# Patient Record
Sex: FEMALE | Race: ASIAN | NOT HISPANIC OR LATINO | Employment: UNEMPLOYED | URBAN - NONMETROPOLITAN AREA
[De-identification: names, ages, dates, MRNs, and addresses within clinical notes are randomized per-mention and may not be internally consistent; named-entity substitution may affect disease eponyms.]

---

## 2021-09-22 ENCOUNTER — OFFICE VISIT (OUTPATIENT)
Dept: GASTROENTEROLOGY | Facility: CLINIC | Age: 51
End: 2021-09-22

## 2021-09-22 VITALS
RESPIRATION RATE: 14 BRPM | HEIGHT: 63 IN | DIASTOLIC BLOOD PRESSURE: 69 MMHG | WEIGHT: 116 LBS | BODY MASS INDEX: 20.55 KG/M2 | TEMPERATURE: 95.4 F | SYSTOLIC BLOOD PRESSURE: 129 MMHG | HEART RATE: 77 BPM

## 2021-09-22 DIAGNOSIS — D50.9 IRON DEFICIENCY ANEMIA, UNSPECIFIED IRON DEFICIENCY ANEMIA TYPE: Primary | ICD-10-CM

## 2021-09-22 DIAGNOSIS — Z12.11 ENCOUNTER FOR SCREENING FOR MALIGNANT NEOPLASM OF COLON: ICD-10-CM

## 2021-09-22 PROCEDURE — 99203 OFFICE O/P NEW LOW 30 MIN: CPT | Performed by: NURSE PRACTITIONER

## 2021-09-22 RX ORDER — FERROUS SULFATE 325(65) MG
325 TABLET ORAL
COMMUNITY
End: 2021-12-20

## 2021-09-22 RX ORDER — SODIUM CHLORIDE 9 MG/ML
70 INJECTION, SOLUTION INTRAVENOUS CONTINUOUS PRN
Status: CANCELLED | OUTPATIENT
Start: 2021-09-22

## 2021-09-22 NOTE — PROGRESS NOTES
New Patient Consult      Date: 2021   Patient Name: Nanci Perera  MRN: [id number]  : 1970     Primary Care Provider: Sofia Bertrand DO    Chief Complaint   Patient presents with   • Anemia     History of Present Illness: Nanci Perera is a 50 y.o. female who is here today to establish care with Gastroenterology for anemia.    She has a history of anemia for a few years. There is no history of GI bleeding, patient denies hematochezia, melena or hematemesis. The patient denies heavy menstrual cycles or nosebleeds. The patient denies recent change in bowel habits. There is no diarrhea or constipation. There is no history of abdominal pain. The patient denies nausea or vomiting. There is no history of reflux. The patient denies dysphagia or odynophagia. There is no history of recent significant weight loss. There is no history of liver disease in the past. There is no family history of GI malignancy. The patient has not had a colonoscopy in the past.    The patient was seen along with her  and video  with patient's consent.    Subjective      Past Medical History:   Diagnosis Date   • Iron deficiency anemia      History reviewed. No pertinent surgical history.  Family History   Problem Relation Age of Onset   • Colon cancer Neg Hx      Social History     Socioeconomic History   • Marital status:      Spouse name: Not on file   • Number of children: Not on file   • Years of education: Not on file   • Highest education level: Not on file   Tobacco Use   • Smoking status: Former Smoker     Quit date:      Years since quittin.7   • Smokeless tobacco: Never Used   Vaping Use   • Vaping Use: Never used   Substance and Sexual Activity   • Alcohol use: Yes     Comment: social   • Drug use: Never   • Sexual activity: Defer       Current Outpatient Medications:   •  ferrous sulfate 325 (65 FE) MG tablet, Take 325 mg by mouth Daily With Breakfast., Disp: , Rfl:     No  "Known Allergies     The following portions of the patient's history were reviewed and updated as appropriate: allergies, current medications, past family history, past medical history, past social history, past surgical history and problem list.    Objective     Physical Exam  Vitals and nursing note reviewed.   Constitutional:       General: She is not in acute distress.     Appearance: Normal appearance. She is well-developed.   HENT:      Head: Normocephalic and atraumatic.      Right Ear: Hearing normal.      Left Ear: Hearing normal.      Mouth/Throat:      Mouth: Mucous membranes are not pale, not dry and not cyanotic.   Eyes:      General: Lids are normal.      Conjunctiva/sclera: Conjunctivae normal.   Neck:      Trachea: Trachea normal.   Cardiovascular:      Rate and Rhythm: Normal rate and regular rhythm.      Heart sounds: Normal heart sounds.   Pulmonary:      Effort: Pulmonary effort is normal. No respiratory distress.      Breath sounds: Normal breath sounds.   Abdominal:      General: Bowel sounds are normal.      Palpations: Abdomen is soft. There is no mass.      Tenderness: There is no abdominal tenderness.      Hernia: No hernia is present.   Skin:     General: Skin is warm and dry.   Neurological:      Mental Status: She is alert and oriented to person, place, and time.   Psychiatric:         Mood and Affect: Mood normal.         Speech: Speech normal.         Behavior: Behavior normal. Behavior is cooperative.       Vitals:    09/22/21 0830   BP: 129/69   Pulse: 77   Resp: 14   Temp: 95.4 °F (35.2 °C)   Weight: 52.6 kg (116 lb)   Height: 160 cm (63\")     Body mass index is 20.55 kg/m².     Results Review:   I have reviewed the patient's new clinical and imaging results.    No results found for any previous visit.      Dated 6/29/2021 total bilirubin 0.3 alkaline phosphatase 41 AST 17 ALT 7 GGT 11 albumin 4.2    Dated 7/15/2021 hemoglobin 10.1 hematocrit 33.9 platelet count 261 iron 27 TIBC 402 " iron saturation 7% vitamin B12 521, folate 9.3, serum ferritin 6    CTAP with contrast dated 7/21/2021 no anterior normal wall masses noted with a marker over the right rectus muscle.  No acute changes are noted with prominence of the endometrial lining.  Electronically signed by MD Pritesh Johnson on 7/21/2021 at 1749    Assessment / Plan      1. Iron deficiency anemia, unspecified iron deficiency anemia type  Patient has a history of anemia for the past few years.  There is no history of GI bleeding, no hematochezia, melena or hematemesis.  Patient denies abdominal pain, nausea, constipation or diarrhea. No change in bowel habits. There is no history of heavy menstrual cycles or nosebleeds.  Labs in July 2021 with hemoglobin 10.1 and hematocrit 33.9, normal iron count at 27, iron saturation low at 7%, B12 normal. She has been taking oral iron supplements for about a month.  CTAP with unremarkable GI tract, although uterus was deviated to the left with thickening of the endometrium.  The patient has appointment for gynecology evaluation next week.  Keep appointment with gynecology.  The patient is due for screening colonoscopy, will rule out lower GI bleeding as cause for anemia.    2. Encounter for screening for malignant neoplasm of colon  She has not had a colonoscopy in the past.  There is no family history of colon cancer.  Colonoscopy for colon cancer screening.    - Case Request; Standing  - Implement Anesthesia Orders Day of Procedure; Standing  - Obtain Informed Consent; Standing  - Verify Bowel Prep Was Successful; Standing  - Oxygen Therapy- Nasal Cannula; 2 LPM; Titrate for SPO2: equal to or greater than, 90%; Standing  - POC Glucose Once; Standing  - sodium chloride 0.9 % infusion  - Case Request    Patient Instructions   Keep appointment with gynecology next week.  Colonoscopy: The indications, technique, alternatives and potential risk and complications were discussed with the patient including but  not limited to bleeding, perforations, missing lesions and anesthetic complications. The patient understands and wishes to proceed with the procedure and has given their verbal consent. Written patient education information was given to the patient.   The patient will call if they have further questions before procedure.     The patient was seen along with her  and video  with her consent.     Jac Magallanes, APRN  9/22/2021    Please note that portions of this note may have been completed with a voice recognition program. Efforts were made to edit the dictations, but occasionally words are mistranscribed.

## 2021-09-22 NOTE — PATIENT INSTRUCTIONS
Keep appointment with gynecology next week.  Colonoscopy: The indications, technique, alternatives and potential risk and complications were discussed with the patient including but not limited to bleeding, perforations, missing lesions and anesthetic complications. The patient understands and wishes to proceed with the procedure and has given their verbal consent. Written patient education information was given to the patient.   The patient will call if they have further questions before procedure.     The patient was seen along with her  and video  with her consent.

## 2021-09-27 ENCOUNTER — OFFICE VISIT (OUTPATIENT)
Dept: OBSTETRICS AND GYNECOLOGY | Facility: CLINIC | Age: 51
End: 2021-09-27

## 2021-09-27 VITALS
DIASTOLIC BLOOD PRESSURE: 66 MMHG | BODY MASS INDEX: 19.84 KG/M2 | HEIGHT: 63 IN | SYSTOLIC BLOOD PRESSURE: 118 MMHG | WEIGHT: 112 LBS

## 2021-09-27 DIAGNOSIS — N92.0 MENORRHAGIA WITH REGULAR CYCLE: Primary | ICD-10-CM

## 2021-09-27 DIAGNOSIS — D50.8 OTHER IRON DEFICIENCY ANEMIA: ICD-10-CM

## 2021-09-27 PROCEDURE — 99204 OFFICE O/P NEW MOD 45 MIN: CPT | Performed by: OBSTETRICS & GYNECOLOGY

## 2021-09-27 NOTE — PROGRESS NOTES
Subjective     Chief Complaint   Patient presents with   • Gynecologic Exam     patient had ct scan in [place] showing endometrium thickened and anemia       Nanci Perera is a 50 y.o. year old  presenting to be seen for gyn option for anemia work up.      History of Present Illness  This 50-year-old nulliparous patient is being seen today in conjunction with work-up of anemia.  She is scheduled to have but has not had a colonoscopy.  She was noted to have mild anemia on routine evaluation.  She has regular menstrual cycles which are perhaps slightly heavier in flow and are of 7 days duration.  She has no intermenstrual bleeding.  She is not sexually active.  She was noted to have an endometrium of 12 mm thickness on a CT scan which I believe was otherwise normal.  She has had normal Pap smears.  And had a normal gynecologic exam in 2021.  She has not had an ultrasound of the pelvis.  She has never used hormonal contraception.  She has never attempted to become pregnant  There is a language barrier to communication and the patient elects to use her own translation service which was available with audio and video communication during our interview.    Past Medical History:   Diagnosis Date   • Iron deficiency anemia       Family History   Problem Relation Age of Onset   • Diabetes Father    • Diabetes Mother    • Colon cancer Neg Hx       Social History     Socioeconomic History   • Marital status:      Spouse name: Not on file   • Number of children: Not on file   • Years of education: Not on file   • Highest education level: Not on file   Tobacco Use   • Smoking status: Former Smoker     Quit date:      Years since quittin.7   • Smokeless tobacco: Never Used   Vaping Use   • Vaping Use: Never used   Substance and Sexual Activity   • Alcohol use: Yes     Comment: social   • Drug use: Never   • Sexual activity: Defer        Her LMP was Patient's last menstrual period was 2021..  Her  "cycles are regular, predictable and consistent every 28 - 32 days.  Usually her flow is typically normal with no more than 0 days of heavy flow each menses.   Additionally she describes no other symptoms associated with her menses.    She is not sexually active.      Current contraceptive methods being used: abstinence.     She exercises regularly: not asked.  She wears her seat belt: not asked.  She has concerns about domestic violence: not asked.    GYN screening history:  · Last pap: she reports her last PAP was normal  · Last mammogram: she reports her last mammogram was normal  · Last fasting lipid profile: her last lipid panel is not available to confirm her report of the results   · Last 25-hydroxy vitamin D level: her last Vitamin D level is not available to confirm her report of the results   · Last colonoscopy: she has never had a colonoscopy done  · Last DEXA: she has never had a DEXA.    Additional Complaints:  None    The following portions of the patient's history were reviewed and updated as appropriate:vital signs, allergies, current medications, past medical history, past social history, past surgical history and problem list.        Review of Systems   Constitutional: Negative.   HENT: Negative.    Eyes: Negative.    Cardiovascular: Negative.    Respiratory: Negative.    Endocrine: Negative.    Skin: Negative.    Musculoskeletal: Negative.    Gastrointestinal: Negative.    Genitourinary: Negative.    Neurological: Negative.    Psychiatric/Behavioral: Negative.        Objective   /66   Ht 160 cm (63\")   Wt 50.8 kg (112 lb)   LMP 09/07/2021   Breastfeeding No   BMI 19.84 kg/m²   Physical Exam  General:  well developed; well nourished  no acute distress   Constitutional: thin and healthy   Skin:  Not performed.   Thyroid: not examined   Lungs:  breathing is unlabored   Heart:  Not performed.   Breasts:  Not performed.   Abdomen: Not performed.   Pelvis: Not performed.   Musculoskeletal: " negative   Neuro: normal without focal findings, mental status, speech normal, alert and oriented x3 and MIKE   Psych: oriented to time, place and person, mood and affect are within normal limits, pt is a good historian; no memory problems were noted       Lab Review   CBC and CMP    Imaging  CT of abdomen/pelvis report    Assessment/Plan   Diagnosis:  1. Menorrhagia with regular cycle   Although her slightly heavier menstrual cycles may be contributing to her mild anemia I do not at this time have reason to suspect a worrisome endometrial abnormality.  In the context of her premenopausal state the CT scan endometrial thickness does not appear immediately worrisome.  However more conventional imaging with transvaginal ultrasound seems in order   2. Other iron deficiency anemia        Orders Placed This Encounter   Procedures   • US Non-ob Transvaginal     Standing Status:   Future     Standing Expiration Date:   9/27/2022     Order Specific Question:   Reason for Exam:     Answer:   menorrhagia with regular cycle beed endo thickness     No orders of the defined types were placed in this encounter.      Follow up: 6 week(s)       I spent 45 minutes caring for Nanci on this date of service. This time includes time spent by me in the following activities: obtaining and/or reviewing a separately obtained history, performing a medically appropriate examination and/or evaluation, counseling and educating the patient/family/caregiver, ordering medications, tests, or procedures, documenting information in the medical record and independently interpreting results and communicating that information with the patient/family/caregiver.     This note was electronically signed.    Yunier Aldana MD  September 27, 2021

## 2021-10-19 ENCOUNTER — TELEPHONE (OUTPATIENT)
Dept: GASTROENTEROLOGY | Facility: CLINIC | Age: 51
End: 2021-10-19

## 2021-10-19 NOTE — TELEPHONE ENCOUNTER
Called patient to schedule procedure. Reached voicemail. Left detailed message for return call to discuss

## 2021-10-25 ENCOUNTER — TELEPHONE (OUTPATIENT)
Dept: GASTROENTEROLOGY | Facility: CLINIC | Age: 51
End: 2021-10-25

## 2021-10-25 DIAGNOSIS — Z12.11 ENCOUNTER FOR SCREENING FOR MALIGNANT NEOPLASM OF COLON: Primary | ICD-10-CM

## 2021-10-25 RX ORDER — POLYETHYLENE GLYCOL 3350 17 G/17G
POWDER, FOR SOLUTION ORAL
Qty: 238 G | Refills: 0 | Status: SHIPPED | OUTPATIENT
Start: 2021-10-25 | End: 2021-11-19 | Stop reason: HOSPADM

## 2021-10-25 RX ORDER — BISACODYL 5 MG/1
TABLET, DELAYED RELEASE ORAL
Qty: 4 TABLET | Refills: 0 | Status: SHIPPED | OUTPATIENT
Start: 2021-10-25 | End: 2021-11-19 | Stop reason: HOSPADM

## 2021-10-25 NOTE — TELEPHONE ENCOUNTER
----- Message from Christy Marrero MA sent at 10/22/2021 11:47 AM EDT -----  Regarding: Colon Prep  Patient is scheduled for colonoscopy on 11/19 at 8:30. Can you send prep to pharmacy and I will mail instructions

## 2021-11-08 ENCOUNTER — OFFICE VISIT (OUTPATIENT)
Dept: OBSTETRICS AND GYNECOLOGY | Facility: CLINIC | Age: 51
End: 2021-11-08

## 2021-11-08 VITALS
DIASTOLIC BLOOD PRESSURE: 62 MMHG | SYSTOLIC BLOOD PRESSURE: 102 MMHG | BODY MASS INDEX: 20.02 KG/M2 | WEIGHT: 113 LBS | HEIGHT: 63 IN

## 2021-11-08 DIAGNOSIS — R93.5 ABNORMAL ULTRASOUND OF ENDOMETRIUM: Primary | ICD-10-CM

## 2021-11-08 DIAGNOSIS — Z01.818 PRE-OP TESTING: ICD-10-CM

## 2021-11-08 DIAGNOSIS — D25.2 SUBSEROUS LEIOMYOMA OF UTERUS: ICD-10-CM

## 2021-11-08 DIAGNOSIS — D50.0 IRON DEFICIENCY ANEMIA DUE TO CHRONIC BLOOD LOSS: ICD-10-CM

## 2021-11-08 PROCEDURE — 99215 OFFICE O/P EST HI 40 MIN: CPT | Performed by: OBSTETRICS & GYNECOLOGY

## 2021-11-08 NOTE — PROGRESS NOTES
Chief complaint:  Follow-up abnormal pelvic imaging  History of present illness:  This patient is being seen for follow-up with regard to anemia work-up and abnormal pelvic imaging.  She had a CAT scan which suggested a uterine abnormality.  She is mildly anemic and undergoing a work-up for this.  She has regular menses which are moderately heavy.  There is been no change.  On ultrasound today there are 3 small fibroids none of which appear to be submucosal.  However the endometrium is decidedly abnormal and 14 mm in thickness and sonographically heterogeneous.  Malignancy needs to be excluded.  This visit was aided with the assistance of electronic .  I reviewed with the patient her history and the reason for consultation.  I reviewed with the patient the findings of her CAT scan and ultrasound.  I reviewed with the patient the need to have histologic evaluation of her endometrium.  I discussed with the patient the advantages and limitations of office endometrial sampling.  I discussed with the patient the advantages of both diagnostic and therapeutic intervention with hysteroscopy.  We discussed the diagnostic possibilities including both benign and malignant disease.  I discussed the risk of hysteroscopy along with the use of the MyoSure device in terms of surgical risk in general and those specific to this procedure.  Review of systems:  14 point reviewed unchanged  Vital signs:  Reviewed  Physical exam:  Not performed  Impression:  Language barrier to communication  Menorrhagia  Anemia  Small uterine fibroids  Abnormally thickened endometrium  Plan:  Hysteroscopy with possible MyoSure sampling or polypectomy.  I spent 40 minutes caring for Nanci on this date of service. This time includes time spent by me in the following activities: preparing for the visit, reviewing tests, obtaining and/or reviewing a separately obtained history, counseling and educating the patient/family/caregiver,  ordering medications, tests, or procedures, documenting information in the medical record, independently interpreting results and communicating that information with the patient/family/caregiver and care coordination.

## 2021-11-10 PROBLEM — Z12.11 ENCOUNTER FOR SCREENING FOR MALIGNANT NEOPLASM OF COLON: Status: ACTIVE | Noted: 2021-11-10

## 2021-11-12 ENCOUNTER — TELEPHONE (OUTPATIENT)
Dept: OBSTETRICS AND GYNECOLOGY | Facility: CLINIC | Age: 51
End: 2021-11-12

## 2021-11-12 NOTE — TELEPHONE ENCOUNTER
Patient advised via  Deborah [phone] dates and time for surgery [facility] 12/17/2021 10:30 arrival 9:15  and covid and labs on  12/15/2021 10:15 at [facility] location      Information mailed to patient also

## 2021-11-12 NOTE — TELEPHONE ENCOUNTER
Call to patient regarding surgery schedule via  Meagan [phone] to call office regarding scheduling.

## 2021-11-16 NOTE — PAT
RN spoke with Brandan Pate from [facility] on 11-15-21 @ 1055 regarding request for a  for patient at 0830 11-19-21.  Brandan verbalized that he will have a live [place]er speaking  available at 0830 but that the  had prior obligations and will have to leave at 1030.  Notation of of this was made in patient's record.  If patient requires  after 1030 staff will use phone system or language line mobile system base on patient's preference.

## 2021-11-19 ENCOUNTER — ANESTHESIA EVENT (OUTPATIENT)
Dept: GASTROENTEROLOGY | Facility: HOSPITAL | Age: 51
End: 2021-11-19

## 2021-11-19 ENCOUNTER — ANESTHESIA (OUTPATIENT)
Dept: GASTROENTEROLOGY | Facility: HOSPITAL | Age: 51
End: 2021-11-19

## 2021-11-19 ENCOUNTER — HOSPITAL ENCOUNTER (OUTPATIENT)
Facility: HOSPITAL | Age: 51
Setting detail: HOSPITAL OUTPATIENT SURGERY
Discharge: HOME | End: 2021-11-19
Attending: INTERNAL MEDICINE | Admitting: INTERNAL MEDICINE

## 2021-11-19 VITALS
DIASTOLIC BLOOD PRESSURE: 60 MMHG | SYSTOLIC BLOOD PRESSURE: 102 MMHG | OXYGEN SATURATION: 100 % | TEMPERATURE: 98.1 F | RESPIRATION RATE: 16 BRPM | HEART RATE: 70 BPM

## 2021-11-19 DIAGNOSIS — D50.9 IRON DEFICIENCY ANEMIA, UNSPECIFIED IRON DEFICIENCY ANEMIA TYPE: Primary | ICD-10-CM

## 2021-11-19 DIAGNOSIS — D50.9 IRON (FE) DEFICIENCY ANEMIA: ICD-10-CM

## 2021-11-19 DIAGNOSIS — Z12.11 ENCOUNTER FOR SCREENING FOR MALIGNANT NEOPLASM OF COLON: ICD-10-CM

## 2021-11-19 DIAGNOSIS — R93.5 ABNORMAL ULTRASOUND OF ENDOMETRIUM: ICD-10-CM

## 2021-11-19 LAB
ALPHA-FETOPROTEIN: 10.6 NG/ML (ref 0–8.3)
B-HCG UR QL: NEGATIVE
CANCER AG125 SERPL QL: 25.2 U/ML (ref 0–38.1)
CANCER AG19-9 SERPL-ACNC: <0.6 U/ML
EXPIRATION DATE: NORMAL
INTERNAL NEGATIVE CONTROL: NEGATIVE
INTERNAL POSITIVE CONTROL: NORMAL
Lab: NORMAL

## 2021-11-19 PROCEDURE — 82105 ALPHA-FETOPROTEIN SERUM: CPT | Performed by: INTERNAL MEDICINE

## 2021-11-19 PROCEDURE — 36415 COLL VENOUS BLD VENIPUNCTURE: CPT | Performed by: INTERNAL MEDICINE

## 2021-11-19 PROCEDURE — 45380 COLONOSCOPY AND BIOPSY: CPT | Performed by: INTERNAL MEDICINE

## 2021-11-19 PROCEDURE — 25010000002 MIDAZOLAM PER 1MG: Performed by: NURSE ANESTHETIST, CERTIFIED REGISTERED

## 2021-11-19 PROCEDURE — 25010000002 PROPOFOL 10 MG/ML EMULSION: Performed by: NURSE ANESTHETIST, CERTIFIED REGISTERED

## 2021-11-19 PROCEDURE — 45385 COLONOSCOPY W/LESION REMOVAL: CPT | Performed by: INTERNAL MEDICINE

## 2021-11-19 PROCEDURE — 86301 IMMUNOASSAY TUMOR CA 19-9: CPT | Performed by: INTERNAL MEDICINE

## 2021-11-19 PROCEDURE — 81025 URINE PREGNANCY TEST: CPT | Performed by: INTERNAL MEDICINE

## 2021-11-19 PROCEDURE — 86304 IMMUNOASSAY TUMOR CA 125: CPT | Performed by: INTERNAL MEDICINE

## 2021-11-19 PROCEDURE — 25010000002 FENTANYL CITRATE (PF) 50 MCG/ML SOLUTION: Performed by: NURSE ANESTHETIST, CERTIFIED REGISTERED

## 2021-11-19 RX ORDER — PROPOFOL 10 MG/ML
VIAL (ML) INTRAVENOUS AS NEEDED
Status: DISCONTINUED | OUTPATIENT
Start: 2021-11-19 | End: 2021-11-19 | Stop reason: SURG

## 2021-11-19 RX ORDER — MIDAZOLAM HYDROCHLORIDE 2 MG/2ML
INJECTION, SOLUTION INTRAMUSCULAR; INTRAVENOUS AS NEEDED
Status: DISCONTINUED | OUTPATIENT
Start: 2021-11-19 | End: 2021-11-19 | Stop reason: SURG

## 2021-11-19 RX ORDER — FENTANYL CITRATE 50 UG/ML
INJECTION, SOLUTION INTRAMUSCULAR; INTRAVENOUS AS NEEDED
Status: DISCONTINUED | OUTPATIENT
Start: 2021-11-19 | End: 2021-11-19 | Stop reason: SURG

## 2021-11-19 RX ORDER — SODIUM CHLORIDE 9 MG/ML
30 INJECTION, SOLUTION INTRAVENOUS CONTINUOUS PRN
Status: CANCELLED | OUTPATIENT
Start: 2021-11-19

## 2021-11-19 RX ORDER — SODIUM CHLORIDE 9 MG/ML
70 INJECTION, SOLUTION INTRAVENOUS CONTINUOUS PRN
Status: DISCONTINUED | OUTPATIENT
Start: 2021-11-19 | End: 2021-11-19 | Stop reason: HOSPADM

## 2021-11-19 RX ORDER — SIMETHICONE 20 MG/.3ML
EMULSION ORAL AS NEEDED
Status: DISCONTINUED | OUTPATIENT
Start: 2021-11-19 | End: 2021-11-19 | Stop reason: HOSPADM

## 2021-11-19 RX ORDER — SODIUM CHLORIDE 9 MG/ML
INJECTION, SOLUTION INTRAVENOUS CONTINUOUS PRN
Status: DISCONTINUED | OUTPATIENT
Start: 2021-11-19 | End: 2021-11-19 | Stop reason: SURG

## 2021-11-19 RX ORDER — KETAMINE HCL IN NACL, ISO-OSM 100MG/10ML
SYRINGE (ML) INJECTION AS NEEDED
Status: DISCONTINUED | OUTPATIENT
Start: 2021-11-19 | End: 2021-11-19 | Stop reason: SURG

## 2021-11-19 RX ADMIN — SODIUM CHLORIDE 70 ML/HR: 9 INJECTION, SOLUTION INTRAVENOUS at 08:57

## 2021-11-19 RX ADMIN — PROPOFOL 30 MG: 10 INJECTION, EMULSION INTRAVENOUS at 09:34

## 2021-11-19 RX ADMIN — FENTANYL CITRATE 50 MCG: 50 INJECTION, SOLUTION INTRAMUSCULAR; INTRAVENOUS at 09:32

## 2021-11-19 RX ADMIN — PROPOFOL 30 MG: 10 INJECTION, EMULSION INTRAVENOUS at 09:38

## 2021-11-19 RX ADMIN — SODIUM CHLORIDE: 9 INJECTION, SOLUTION INTRAVENOUS at 08:51

## 2021-11-19 RX ADMIN — PROPOFOL 30 MG: 10 INJECTION, EMULSION INTRAVENOUS at 09:42

## 2021-11-19 RX ADMIN — Medication 25 MG: at 09:36

## 2021-11-19 RX ADMIN — MIDAZOLAM HYDROCHLORIDE 2 MG: 1 INJECTION, SOLUTION INTRAMUSCULAR; INTRAVENOUS at 09:31

## 2021-11-19 RX ADMIN — FENTANYL CITRATE 50 MCG: 50 INJECTION, SOLUTION INTRAMUSCULAR; INTRAVENOUS at 09:37

## 2021-11-19 NOTE — H&P
[facility]  HISTORY AND PHYSICAL    Patient Name: Tamar Perera  : 1970  MRN: [id number]    Chief Complaint:   For screening colonoscopy    History Of Presenting Illness:    Average risk colon cancer screening  WILFRED    Past Medical History:   Diagnosis Date   • Iron deficiency anemia        History reviewed. No pertinent surgical history.    Social History     Socioeconomic History   • Marital status:    Tobacco Use   • Smoking status: Former Smoker     Quit date:      Years since quittin.8   • Smokeless tobacco: Never Used   Vaping Use   • Vaping Use: Never used   Substance and Sexual Activity   • Alcohol use: Yes     Comment: social   • Drug use: Never   • Sexual activity: Defer       Family History   Problem Relation Age of Onset   • Diabetes Father    • Diabetes Mother    • Colon cancer Neg Hx        Prior to Admission Medications:  Medications Prior to Admission   Medication Sig Dispense Refill Last Dose   • bisacodyl (DULCOLAX) 5 MG EC tablet Take as directed for colon prep 4 tablet 0 2021 at Unknown time   • ferrous sulfate 325 (65 FE) MG tablet Take 325 mg by mouth Daily With Breakfast.   Past Week at Unknown time   • polyethylene glycol (MiraLax) 17 GM/SCOOP powder Take as directed for colonoscopy prep 238 g 0 2021 at Unknown time       Allergies:  No Known Allergies     Vitals: Temp:  [97.2 °F (36.2 °C)] 97.2 °F (36.2 °C)  Heart Rate:  [101] 101  Resp:  [18] 18  BP: (110)/(68) 110/68    Review Of Systems:  Constitutional:  Negative for chills, fever, and unexpected weight change.  Respiratory:  Negative for cough, chest tightness, shortness of breath, and wheezing.  Cardiovascular:  Negative for chest pain, palpitations, and leg swelling.  Gastrointestinal:  Negative for abdominal distention, abdominal pain, Nausea, vomiting.  Neurological:  Negative for Weakness, numbness, and headaches.     Physical Exam:    General Appearance:  Alert, cooperative, in  no acute distress.   Lungs:   Clear to auscultation, respirations regular, even and                 unlabored.   Heart:  Regular rhythm and normal rate.   Abdomen:   Normal bowel sounds, no masses, no organomegaly. Soft, non-tender, non-distended   Neurologic: Alert and oriented x 3. Moves all four limbs equally       Plan: COLONOSCOPY (N/A)     Zoran Jasmine MD  11/19/2021

## 2021-11-19 NOTE — ANESTHESIA PREPROCEDURE EVALUATION
Anesthesia Evaluation     Patient summary reviewed and Nursing notes reviewed   no history of anesthetic complications:  NPO Solid Status: > 8 hours  NPO Liquid Status: > 8 hours           Airway   Mallampati: II  TM distance: >3 FB  Neck ROM: full  Possible difficult intubation and No difficulty expected  Dental      Pulmonary    (+) a smoker Former, decreased breath sounds,   Cardiovascular         Neuro/Psych  GI/Hepatic/Renal/Endo      Musculoskeletal     Abdominal    Substance History   (+) alcohol use,      OB/GYN          Other   blood dyscrasia anemia,                     Anesthesia Plan    ASA 2     MAC   (Risks and benefits discussed including risk of aspiration, recall and dental damage. All patient questions answered.    Will continue with plan of care.)  intravenous induction     Anesthetic plan, all risks, benefits, and alternatives have been provided, discussed and informed consent has been obtained with: patient.       Pt informed voiced understanding

## 2021-11-19 NOTE — DISCHARGE INSTRUCTIONS
Rest today  No pushing,pulling,tugging,heavy lifting, or strenuous activity   No major decision making,driving,or drinking alcoholic beverages for 24 hours due to the sedation you received  Always use good hand hygiene/washing technique  No driving on pain medication.    To assist you in voiding:  Drink plenty of fluids  Listen to running water while attempting to void.    If you are unable to urinate and you have an uncomfortable urge to void or it has been   6 hours since you were discharged, return to the Emergency Room.    - Discharge patient to home (ambulatory).   - High fiber diet.   - Continue present medications.   - Await pathology results.   - Tumor markers today  - Urgent EGD  - Needs pillCam if EGD normal   - Repeat colonoscopy in 7-10 years for surveillance.   - Return to GI office in 6 weeks.

## 2021-11-19 NOTE — ANESTHESIA POSTPROCEDURE EVALUATION
Patient: Tamar Perera    Procedure Summary     Date: 11/19/21 Room / Location: [facility] ENDOSCOPY 1 / [facility] ENDOSCOPY    Anesthesia Start: 0929 Anesthesia Stop:     Procedure: COLONOSCOPY with Biopsy and polypectomy (N/A Anus) Diagnosis:       Encounter for screening for malignant neoplasm of colon      (Encounter for screening for malignant neoplasm of colon [Z12.11])    Surgeons: Zoran Jasmine MD Provider: Liban Kendrick CRNA    Anesthesia Type: MAC ASA Status: 2          Anesthesia Type: MAC    Vitals  No vitals data found for the desired time range.          Post Anesthesia Care and Evaluation    Patient location during evaluation: PHASE II  Patient participation: complete - patient participated  Level of consciousness: awake  Pain score: 0  Pain management: adequate  Airway patency: patent  Anesthetic complications: No anesthetic complications  PONV Status: none  Cardiovascular status: acceptable  Respiratory status: acceptable and nasal cannula  Hydration status: acceptable    Comments: vsss resp spont, reflexes intact, responsive, report given to pacu nurse

## 2021-11-22 ENCOUNTER — LAB (OUTPATIENT)
Dept: LAB | Facility: HOSPITAL | Age: 51
End: 2021-11-22

## 2021-11-22 DIAGNOSIS — Z01.818 PREOP TESTING: ICD-10-CM

## 2021-11-22 DIAGNOSIS — Z01.818 PREOP TESTING: Primary | ICD-10-CM

## 2021-11-22 PROCEDURE — C9803 HOPD COVID-19 SPEC COLLECT: HCPCS

## 2021-11-22 PROCEDURE — U0004 COV-19 TEST NON-CDC HGH THRU: HCPCS

## 2021-11-23 LAB — SARS-COV-2 RNA NOSE QL NAA+PROBE: NOT DETECTED

## 2021-11-24 ENCOUNTER — HOSPITAL ENCOUNTER (OUTPATIENT)
Facility: HOSPITAL | Age: 51
Setting detail: HOSPITAL OUTPATIENT SURGERY
Discharge: HOME | End: 2021-11-24
Attending: INTERNAL MEDICINE | Admitting: INTERNAL MEDICINE

## 2021-11-24 ENCOUNTER — ANESTHESIA (OUTPATIENT)
Dept: GASTROENTEROLOGY | Facility: HOSPITAL | Age: 51
End: 2021-11-24

## 2021-11-24 ENCOUNTER — ANESTHESIA EVENT (OUTPATIENT)
Dept: GASTROENTEROLOGY | Facility: HOSPITAL | Age: 51
End: 2021-11-24

## 2021-11-24 VITALS
RESPIRATION RATE: 16 BRPM | SYSTOLIC BLOOD PRESSURE: 112 MMHG | HEART RATE: 60 BPM | DIASTOLIC BLOOD PRESSURE: 77 MMHG | TEMPERATURE: 98.4 F | OXYGEN SATURATION: 100 %

## 2021-11-24 DIAGNOSIS — R97.8 ABNORMAL TUMOR MARKERS: Primary | ICD-10-CM

## 2021-11-24 DIAGNOSIS — D50.9 IRON DEFICIENCY ANEMIA, UNSPECIFIED IRON DEFICIENCY ANEMIA TYPE: ICD-10-CM

## 2021-11-24 LAB
LAB AP CASE REPORT: NORMAL
PATH REPORT.FINAL DX SPEC: NORMAL

## 2021-11-24 PROCEDURE — 43239 EGD BIOPSY SINGLE/MULTIPLE: CPT | Performed by: INTERNAL MEDICINE

## 2021-11-24 PROCEDURE — 25010000002 PROPOFOL 10 MG/ML EMULSION: Performed by: NURSE ANESTHETIST, CERTIFIED REGISTERED

## 2021-11-24 RX ORDER — PROPOFOL 10 MG/ML
VIAL (ML) INTRAVENOUS AS NEEDED
Status: DISCONTINUED | OUTPATIENT
Start: 2021-11-24 | End: 2021-11-24 | Stop reason: SURG

## 2021-11-24 RX ORDER — SODIUM CHLORIDE 9 MG/ML
30 INJECTION, SOLUTION INTRAVENOUS CONTINUOUS PRN
Status: DISCONTINUED | OUTPATIENT
Start: 2021-11-24 | End: 2021-11-24 | Stop reason: HOSPADM

## 2021-11-24 RX ORDER — ONDANSETRON 2 MG/ML
4 INJECTION INTRAMUSCULAR; INTRAVENOUS ONCE AS NEEDED
Status: CANCELLED | OUTPATIENT
Start: 2021-11-24 | End: 2021-11-24

## 2021-11-24 RX ADMIN — SODIUM CHLORIDE 30 ML/HR: 9 INJECTION, SOLUTION INTRAVENOUS at 13:08

## 2021-11-24 RX ADMIN — PROPOFOL 200 MG: 10 INJECTION, EMULSION INTRAVENOUS at 14:04

## 2021-11-24 NOTE — ANESTHESIA POSTPROCEDURE EVALUATION
Patient: Tamar Perera    Procedure Summary     Date: 11/24/21 Room / Location: [facility] ENDOSCOPY 1 / [facility] ENDOSCOPY    Anesthesia Start: 1356 Anesthesia Stop: 1411    Procedure: ESOPHAGOGASTRODUODENOSCOPY WITH BIOPSY  (N/A ) Diagnosis:       Iron deficiency anemia, unspecified iron deficiency anemia type      (Iron deficiency anemia, unspecified iron deficiency anemia type [D50.9])    Surgeons: Zoran Jasmine MD Provider: Chalo Mcnally CRNA    Anesthesia Type: MAC ASA Status: 2          Anesthesia Type: MAC    Vitals  Vitals Value Taken Time   /77 11/24/21 1433   Temp 98.4 °F (36.9 °C) 11/24/21 1414   Pulse 60 11/24/21 1433   Resp 16 11/24/21 1433   SpO2 100 % 11/24/21 1433           Post Anesthesia Care and Evaluation    Patient location during evaluation: PHASE II  Patient participation: complete - patient participated  Level of consciousness: awake  Pain score: 1  Pain management: adequate  Airway patency: patent  Anesthetic complications: No anesthetic complications  PONV Status: controlled  Cardiovascular status: acceptable and stable  Respiratory status: acceptable  Hydration status: acceptable

## 2021-11-24 NOTE — ANESTHESIA PREPROCEDURE EVALUATION
Anesthesia Evaluation     Patient summary reviewed and Nursing notes reviewed   no history of anesthetic complications:  NPO Solid Status: > 8 hours  NPO Liquid Status: > 8 hours           Airway   Mallampati: II  TM distance: >3 FB  Neck ROM: full  no difficulty expected  Dental - normal exam     Pulmonary - normal exam   (+) a smoker Former,   Cardiovascular - negative cardio ROS and normal exam    Rhythm: regular  Rate: normal        Neuro/Psych- negative ROS  GI/Hepatic/Renal/Endo - negative ROS     Musculoskeletal (-) negative ROS    Abdominal    Substance History - negative use     OB/GYN negative ob/gyn ROS         Other - negative ROS                     Anesthesia Plan    ASA 2     MAC   (Pt told that intravenous sedation will be used as the primary anesthetic along with local anesthesia if necessary. Every effort will be made to make sure the patient is comfortable.     The patient was told they may or may not have recall for the procedure. It was further explained that if the MAC was not adequate that a general anesthetic with either an LMA or endotracheal tube would be required.     Will proceed with the plan of care.)  intravenous induction     Anesthetic plan, all risks, benefits, and alternatives have been provided, discussed and informed consent has been obtained with: patient.

## 2021-12-01 LAB
LAB AP CASE REPORT: NORMAL
PATH REPORT.FINAL DX SPEC: NORMAL

## 2021-12-10 ENCOUNTER — TELEPHONE (OUTPATIENT)
Dept: OBSTETRICS AND GYNECOLOGY | Facility: CLINIC | Age: 51
End: 2021-12-10

## 2021-12-10 NOTE — TELEPHONE ENCOUNTER
Call to patient via  services desk [phone] [place] for patient to call office regarding rescheduling of surgery currently scheduled on 12/17/2021

## 2021-12-13 DIAGNOSIS — N93.9 ABNORMAL UTERINE BLEEDING (AUB): Primary | ICD-10-CM

## 2021-12-15 ENCOUNTER — LAB (OUTPATIENT)
Dept: PREADMISSION TESTING | Facility: HOSPITAL | Age: 51
End: 2021-12-15

## 2021-12-15 ENCOUNTER — LAB (OUTPATIENT)
Dept: LAB | Facility: HOSPITAL | Age: 51
End: 2021-12-15

## 2021-12-15 DIAGNOSIS — Z01.818 PRE-OP TESTING: ICD-10-CM

## 2021-12-15 LAB
DEPRECATED RDW RBC AUTO: 38.3 FL (ref 37–54)
ERYTHROCYTE [DISTWIDTH] IN BLOOD BY AUTOMATED COUNT: 12.6 % (ref 12.3–15.4)
HCG INTACT+B SERPL-ACNC: <0.5 MIU/ML
HCT VFR BLD AUTO: 37 % (ref 34–46.6)
HGB BLD-MCNC: 12 G/DL (ref 12–15.9)
MCH RBC QN AUTO: 27.5 PG (ref 26.6–33)
MCHC RBC AUTO-ENTMCNC: 32.4 G/DL (ref 31.5–35.7)
MCV RBC AUTO: 84.9 FL (ref 79–97)
PLATELET # BLD AUTO: 217 10*3/MM3 (ref 140–450)
PMV BLD AUTO: 11.7 FL (ref 6–12)
RBC # BLD AUTO: 4.36 10*6/MM3 (ref 3.77–5.28)
SARS-COV-2 RNA PNL SPEC NAA+PROBE: NOT DETECTED
WBC NRBC COR # BLD: 6.55 10*3/MM3 (ref 3.4–10.8)

## 2021-12-15 PROCEDURE — 84702 CHORIONIC GONADOTROPIN TEST: CPT

## 2021-12-15 PROCEDURE — 85027 COMPLETE CBC AUTOMATED: CPT

## 2021-12-15 PROCEDURE — C9803 HOPD COVID-19 SPEC COLLECT: HCPCS

## 2021-12-15 PROCEDURE — U0004 COV-19 TEST NON-CDC HGH THRU: HCPCS

## 2021-12-16 ENCOUNTER — PREP FOR SURGERY (OUTPATIENT)
Dept: OTHER | Facility: HOSPITAL | Age: 51
End: 2021-12-16

## 2021-12-16 NOTE — H&P
Subjective   CC: pre op  Tamar Perera is a 51 y.o. year old  who is scheduled  for surgery due to abnormal uterine bleeding with heavy menstrual cycles, anemia and leiomyoma. She was scheduled to have a hysteroscopy, with possible myosure sampling or polypectomy with my partner Dr. Aldana but he had to be out unexpectedly.     Past Medical History:   Diagnosis Date   • Iron deficiency anemia      Past Surgical History:   Procedure Laterality Date   • COLONOSCOPY N/A 2021    Procedure: COLONOSCOPY with Biopsy and polypectomy;  Surgeon: Zoran Jasmine MD;  Location: [facility] ENDOSCOPY;  Service: Gastroenterology;  Laterality: N/A;   • ENDOSCOPY N/A 2021    Procedure: ESOPHAGOGASTRODUODENOSCOPY WITH BIOPSY ;  Surgeon: Zoran Jasmine MD;  Location: [facility] ENDOSCOPY;  Service: Gastroenterology;  Laterality: N/A;     OB History    Para Term  AB Living   0 0 0 0 0 0   SAB IAB Ectopic Molar Multiple Live Births   0 0 0 0 0 0     Social History     Socioeconomic History   • Marital status:    Tobacco Use   • Smoking status: Former Smoker     Quit date:      Years since quittin   • Smokeless tobacco: Never Used   Vaping Use   • Vaping Use: Never used   Substance and Sexual Activity   • Alcohol use: Yes     Comment: social   • Drug use: Never   • Sexual activity: Defer     Prior to Admission medications    Medication Sig Start Date End Date Taking? Authorizing Provider   ferrous sulfate 325 (65 FE) MG tablet Take 325 mg by mouth Daily With Breakfast.    Provider, MD Fabián       No Known Allergies  Social History    Tobacco Use      Smoking status: Former Smoker        Quit date:         Years since quittin.      Smokeless tobacco: Never Used    Review of Systems   Constitutional: Negative for chills and fever.   HENT: Negative for congestion and sore throat.    Respiratory: Negative for shortness of breath and wheezing.    Cardiovascular:  Negative for chest pain and palpitations.   Gastrointestinal: Negative for abdominal pain, nausea and vomiting.   Genitourinary: Negative for frequency, vaginal bleeding and vaginal pain.   Musculoskeletal: Negative for back pain and myalgias.   Neurological: Negative for dizziness, light-headedness and headaches.   Psychiatric/Behavioral: Negative for confusion. The patient is not nervous/anxious.          Objective   There were no vitals taken for this visit.  General: well developed; well nourished  no acute distress   Alert and oriented x3   Heart: Not performed.   Lungs: breathing is unlabored   Abdomen: soft, non-tender; no masses  no umbilical or inguinal hernias are present  no hepato-splenomegaly   Pelvis:: Not performed.       Indication  ========     Menorrhagia        Comparison Studies  ================     There are no relevant prior studies to which this study is being compared        Assessment  ==========     LMP on 10/26/2021        Method  ======     [place] S10, Transvaginal ultrasound examination, Color Doppler flow performed, 3D ultrasound examination. View: Adequate view        Uterus  ======     Uterus:  Normal  Uterus details:   Fibroid(s) present  Uterus position: Anteverted  Description of uterine malformations:       none  Myometrium:      Fibroids are present  Endometrium:    endometrial midline: linear, Heterogeneous  Cervix details:    Normal  Uterus long        63 mm  Uterus ap           57 mm  Uterus tr             49 mm  Uterus Vol          91.0 cm³  Endometrial thickness, total        14.8 mm  Cervical length   17.7 mm  Uterine fibroid D1           36.8 mm  Uterine fibroid D2           26.9 mm  Uterine fibroid D3           33.6 mm  Uterine fibroid vol           17.413 cm³  Uterine fibroids findings: Pedunculated;Posterior  Uterine fibroid D1           28.9 mm  Uterine fibroid D2           23.9 mm  Uterine fibroid D3           28.1 mm  Uterine fibroid vol           10.183  cm³  Uterine fibroids findings: Pedunculated;Posterior  Uterine fibroid D1           8.4 mm  Uterine fibroid D2           6.7 mm  Uterine fibroid D3           6.7 mm  Uterine fibroid vol           0.197 cm³  Uterine fibroids findings: Intramural;Anterior        Right Ovary  =========     Rt ovary:            Normal  Rt ovary D1       20.0 mm  Rt ovary D2       18.4 mm  Rt ovary D3       8.8 mm  Rt ovary Vol       1.7 cm³  Rt ovarian follicle(s):       Follicles identified        Left Ovary  ========     Lt ovary:             Normal  Lt ovary D1        29.7 mm  Lt ovary D2        17.2 mm  Lt ovary D3        13.10 mm  Lt ovary Vol       3.5 cm³  Lt ovarian follicle(s):       Follicles identified        Cul de Sac  =========     Normal. Free fluid visualized  Largest pool 21.6 mm x 12.0 mm x 13.2 mm. Vol 1.791 ml        Impression  =========     Uteromegaly.  Findings are consistent with leiomyomata. The left ovary appear sonographically normal in size, shape and morphology.  The right ovary appear sonographically normal in size, shape and morphology.  The endometrium is thickened and sonographically abnormal suggestive of hyperplasia or polyp        Recommendation  ==============     Clinical judgement is needed to determine if further work-up is needed.       Assessment   1. Abnormal uterine bleeding     Plan   1. Hysteroscopy, D&C, myosure   Today I discussed with Tamar the risks of her upcoming hysteroscopy with possible Myosure resection of intracavitary pathology. Risks reviewed included intraoperative bleeding, infection at the site of surgery and damage to the adjacent surrounding organs. Additionally, the small risk for reoperation in the event of unanticipated bleeding or surgical injury was discussed.  Finally we discussed the risks of cerebral and/or pulmonary edema related to excess absorption of the distending fluid and the small chance the procedure could not be completed if there was an excessive  mismatch of fluid infused & fluid recovered.  All of her questions were answered fully.  She left with a very clear understanding of the preoperative surgical indications and the nature of the surgery for which she is scheduled.  She understands to be NPO after midnight and to be at the preoperative area ~ 1 hour prior to the scheduled surgical start time.            Alka Harper MD  12/16/2021       (Pt's PCP is Sofia Bertrand DO)

## 2021-12-17 ENCOUNTER — OUTSIDE FACILITY SERVICE (OUTPATIENT)
Dept: OBSTETRICS AND GYNECOLOGY | Facility: CLINIC | Age: 51
End: 2021-12-17

## 2021-12-17 PROCEDURE — 88305 TISSUE EXAM BY PATHOLOGIST: CPT | Performed by: OBSTETRICS & GYNECOLOGY

## 2021-12-17 PROCEDURE — 58558 HYSTEROSCOPY BIOPSY: CPT | Performed by: OBSTETRICS & GYNECOLOGY

## 2021-12-17 RX ORDER — DOCUSATE SODIUM 100 MG/1
100 CAPSULE, LIQUID FILLED ORAL 2 TIMES DAILY PRN
Qty: 60 CAPSULE | Refills: 1 | Status: SHIPPED | OUTPATIENT
Start: 2021-12-17 | End: 2021-12-20

## 2021-12-17 RX ORDER — IBUPROFEN 600 MG/1
600 TABLET ORAL EVERY 6 HOURS PRN
Qty: 60 TABLET | Refills: 1 | Status: SHIPPED | OUTPATIENT
Start: 2021-12-17 | End: 2021-12-20

## 2021-12-20 ENCOUNTER — LAB REQUISITION (OUTPATIENT)
Dept: LAB | Facility: HOSPITAL | Age: 51
End: 2021-12-20

## 2021-12-20 ENCOUNTER — OFFICE VISIT (OUTPATIENT)
Dept: GASTROENTEROLOGY | Facility: CLINIC | Age: 51
End: 2021-12-20

## 2021-12-20 VITALS
WEIGHT: 117 LBS | SYSTOLIC BLOOD PRESSURE: 118 MMHG | HEART RATE: 74 BPM | TEMPERATURE: 98.3 F | DIASTOLIC BLOOD PRESSURE: 66 MMHG | RESPIRATION RATE: 16 BRPM | HEIGHT: 63 IN | BODY MASS INDEX: 20.73 KG/M2

## 2021-12-20 DIAGNOSIS — R77.2 ELEVATED AFP: Chronic | ICD-10-CM

## 2021-12-20 DIAGNOSIS — D25.9 LEIOMYOMA OF UTERUS, UNSPECIFIED: ICD-10-CM

## 2021-12-20 DIAGNOSIS — Z12.11 ENCOUNTER FOR SCREENING FOR MALIGNANT NEOPLASM OF COLON: ICD-10-CM

## 2021-12-20 DIAGNOSIS — D50.9 IRON DEFICIENCY ANEMIA, UNSPECIFIED IRON DEFICIENCY ANEMIA TYPE: Primary | Chronic | ICD-10-CM

## 2021-12-20 PROCEDURE — 99214 OFFICE O/P EST MOD 30 MIN: CPT | Performed by: NURSE PRACTITIONER

## 2021-12-20 NOTE — PATIENT INSTRUCTIONS
1. High fiber, low fat diet with liberal water intake.   2. Patient will bring in copy of ultrasound to our office.  3. Colonoscopy for screening in 10 years.   4. Follow up: 3 months or sooner if needed

## 2021-12-20 NOTE — PROGRESS NOTES
Follow Up Note     Date: 2021   Patient Name: Tamar Perrea  MRN: [id number]  : 1970     Primary Care Provider: Sofia Bertrand DO     Chief Complaint   Patient presents with   • Follow-up     History of present illness:   2021  Tamar Perera is a 51 y.o. female who is here today for follow up for anemia.    She denies GI bleeding. She had hysteroscopy by GYN last Friday, does not have results yet. She had abdominal/liver ultrasound a week ago in [place], but does not know results yet and does not know the name of the facility she had the ultrasound performed at. They are mailing her a copy of the results and she will bring it to our office.     Interval History:  2021  She has a history of anemia for a few years. There is no history of GI bleeding, patient denies hematochezia, melena or hematemesis. The patient denies heavy menstrual cycles or nosebleeds. The patient denies recent change in bowel habits. There is no diarrhea or constipation. There is no history of abdominal pain. The patient denies nausea or vomiting. There is no history of reflux. The patient denies dysphagia or odynophagia. There is no history of recent significant weight loss. There is no history of liver disease in the past. There is no family history of GI malignancy. The patient has not had a colonoscopy in the past.     The patient was seen along with her  and video  with patient's consent.    Subjective      Past Medical History:   Diagnosis Date   • Iron deficiency anemia      Past Surgical History:   Procedure Laterality Date   • COLONOSCOPY N/A 2021    Procedure: COLONOSCOPY with Biopsy and polypectomy;  Surgeon: Zoran Jasmine MD;  Location: [facility] ENDOSCOPY;  Service: Gastroenterology;  Laterality: N/A;   • ENDOSCOPY N/A 2021    Procedure: ESOPHAGOGASTRODUODENOSCOPY WITH BIOPSY ;  Surgeon: Zoran Jasmine MD;  Location: [facility] ENDOSCOPY;  Service:  Gastroenterology;  Laterality: N/A;   • HYSTEROSCOPY W/ POLYPECTOMY  2021    with myosure      Family History   Problem Relation Age of Onset   • Diabetes Father    • Diabetes Mother    • Colon cancer Neg Hx      Social History     Socioeconomic History   • Marital status:    Tobacco Use   • Smoking status: Former Smoker     Quit date:      Years since quittin.9   • Smokeless tobacco: Never Used   Vaping Use   • Vaping Use: Never used   Substance and Sexual Activity   • Alcohol use: Yes     Comment: social   • Drug use: Never   • Sexual activity: Defer     No current outpatient medications on file.     No Known Allergies     The following portions of the patient's history were reviewed and updated as appropriate: allergies, current medications, past family history, past medical history, past social history, past surgical history and problem list.  Objective     Physical Exam  Vitals and nursing note reviewed.   Constitutional:       General: She is not in acute distress.     Appearance: Normal appearance. She is well-developed.   HENT:      Head: Normocephalic and atraumatic.      Right Ear: Hearing normal.      Left Ear: Hearing normal.      Mouth/Throat:      Mouth: Mucous membranes are not pale, not dry and not cyanotic.   Eyes:      General: Lids are normal.      Conjunctiva/sclera: Conjunctivae normal.   Neck:      Trachea: Trachea normal.   Cardiovascular:      Rate and Rhythm: Normal rate and regular rhythm.      Heart sounds: Normal heart sounds.   Pulmonary:      Effort: Pulmonary effort is normal. No respiratory distress.      Breath sounds: Normal breath sounds.   Abdominal:      General: Bowel sounds are normal.      Palpations: Abdomen is soft. There is no mass.      Tenderness: There is no abdominal tenderness.      Hernia: No hernia is present.   Skin:     General: Skin is warm and dry.   Neurological:      Mental Status: She is alert and oriented to person, place, and time.  "  Psychiatric:         Mood and Affect: Mood normal.         Speech: Speech normal.         Behavior: Behavior normal. Behavior is cooperative.       Vitals:    12/20/21 0850   BP: 118/66   Pulse: 74   Resp: 16   Temp: 98.3 °F (36.8 °C)   Weight: 53.1 kg (117 lb)   Height: 160 cm (63\")     Body mass index is 20.73 kg/m².     Results Review:   I reviewed the patient's new clinical results.    Lab on 12/15/2021   Component Date Value Ref Range Status   • HCG Quantitative 12/15/2021 <0.50  mIU/mL Final   • WBC 12/15/2021 6.55  3.40 - 10.80 10*3/mm3 Final   • RBC 12/15/2021 4.36  3.77 - 5.28 10*6/mm3 Final   • Hemoglobin 12/15/2021 12.0  12.0 - 15.9 g/dL Final   • Hematocrit 12/15/2021 37.0  34.0 - 46.6 % Final   • MCV 12/15/2021 84.9  79.0 - 97.0 fL Final   • MCH 12/15/2021 27.5  26.6 - 33.0 pg Final   • MCHC 12/15/2021 32.4  31.5 - 35.7 g/dL Final   • RDW 12/15/2021 12.6  12.3 - 15.4 % Final   • RDW-SD 12/15/2021 38.3  37.0 - 54.0 fl Final   • MPV 12/15/2021 11.7  6.0 - 12.0 fL Final   • Platelets 12/15/2021 217  140 - 450 10*3/mm3 Final   Lab on 12/15/2021   Component Date Value Ref Range Status   • COVID19 12/15/2021 Not Detected  Not Detected - Ref. Range Final   Admission on 11/24/2021, Discharged on 11/24/2021   Component Date Value Ref Range Status   • Case Report 11/24/2021    Final                    Value:Surgical Pathology Report                         Case: [id number]                                  Authorizing Provider:  Zoran Jasmine MD  Collected:           11/24/2021 02:01 PM          Ordering Location:     [facility]    Received:            11/29/2021 09:44 AM                                 [facility] END[facility]                                                                    Pathologist:           Kaushal Jain MD                                                       Specimens:   1) - Small Intestine, duodenal biopsy                                                               2) " - Gastric, Antrum, gastric for h. pylori                                               • Final Diagnosis 11/24/2021    Final                    Value:This result contains rich text formatting which cannot be displayed here.   Lab on 11/22/2021   Component Date Value Ref Range Status   • SARS-CoV-2 SHONNA 11/22/2021 Not Detected  Not Detected Final   Admission on 11/19/2021, Discharged on 11/19/2021   Component Date Value Ref Range Status   • HCG, Urine, QL 11/19/2021 Negative  Negative Final   • Lot Number 11/19/2021 [id number]   Final   • Internal Positive Control 11/19/2021 Passed  Positive, Passed Final   • Internal Negative Control 11/19/2021 Negative  Negative, Passed Final   • Expiration Date 11/19/2021 3-31-23   Final   • Case Report 11/19/2021    Final                    Value:Surgical Pathology Report                         Case: [id number]                                  Authorizing Provider:  Zoran Jasmine MD  Collected:           11/19/2021 09:48 AM          Ordering Location:     [facility]    Received:            11/19/2021 02:43 PM                                 SUR[facility] ENDO                                                                    Pathologist:           Kaushal Jain MD                                                       Specimens:   1) - Small Intestine, Ileum, Terminal Ileum                                                         2) - Large Intestine, Rectum                                                              • Final Diagnosis 11/19/2021    Final                    Value:This result contains rich text formatting which cannot be displayed here.   •  11/19/2021 25.2  0.0 - 38.1 U/mL Final   • CA 19-9 11/19/2021 <0.6  <=35.0 U/mL Final   • ALPHA-FETOPROTEIN 11/19/2021 10.60* 0 - 8.3 ng/mL Final      No radiology results for the last 90 days.     Dated 6/29/2021 total bilirubin 0.3 alkaline phosphatase 41 AST 17 ALT 7 GGT 11 albumin 4.2     Dated  7/15/2021 hemoglobin 10.1 hematocrit 33.9 platelet count 261 iron 27 TIBC 402 iron saturation 7% vitamin B12 521, folate 9.3, serum ferritin 6     CTAP with contrast dated 7/21/2021 no anterior normal wall masses noted with a marker over the right rectus muscle.  No acute changes are noted with prominence of the endometrial lining.  Electronically signed by MD Pritesh Johnson on 7/21/2021 at 1749    Colonoscopy dated 11/19/2021 per Dr. Jasmine  - One 3 to 4 mm polyp in the proximal rectum, removed with a cold snare. Resected and retrieved. Clinically   hyperplastic  - Diverticulosis in the sigmoid colon, in the descending colon, in the transverse colon and in the ascending colon.  - Non-bleeding internal hemorrhoids.  - The examined portion of the ileum was normal. Biopsied.  - No lower GI source of GI bleed identified that could cause anemia  -Terminal ileum biopsy unremarkable. Rectal polyp biopsy with mucosal prolapse type polyp.    EGD dated 11/24/2021 per Dr. Jasmine  - Normal oropharynx.  - Z-line regular, 35 cm from the incisors.  - 1 cm hiatal hernia.  - No gross lesions in esophagus.  - Normal cardia, gastric fundus, gastric body, incisura, antrum, prepyloric region of the stomach and pylorus.  Biopsied.  - Normal duodenal bulb, first portion of the duodenum, second portion of the duodenum and third portion of the duodenum. Biopsied.  - Duodenum biopsy unremarkable. Gastric biopsy with reactive gastropathy, negative H. Pylori.    Assessment / Plan      1. Iron deficiency anemia, unspecified iron deficiency anemia type   Patient denies GI bleeding.  Recent labs normal, no anemia.  No GI bleeding per colonoscopy or EGD.  She is following with gynecology and had hysteroscopy last week but does not have results yet.  Consider small bowel PillCam if endometrial biopsy comes back negative for neoplasia.    9/22/2021  Patient has a history of anemia for the past few years.  There is no history of GI bleeding,  no hematochezia, melena or hematemesis.  Patient denies abdominal pain, nausea, constipation or diarrhea. No change in bowel habits. There is no history of heavy menstrual cycles or nosebleeds.  Labs in July 2021 with hemoglobin 10.1 and hematocrit 33.9, normal iron count at 27, iron saturation low at 7%, B12 normal. She has been taking oral iron supplements for about a month.  CTAP with unremarkable GI tract, although uterus was deviated to the left with thickening of the endometrium.  The patient has appointment for gynecology evaluation next week.  Keep appointment with gynecology.  The patient is due for screening colonoscopy, will rule out lower GI bleeding as cause for anemia.    2. Elevated AFP  AFP tumor marker elevated at 10.6.  No history of elevated liver enzymes or liver disease in the past.  She had the abdominal ultrasound done in [place] a few weeks ago but does not have the results yet (Unsure why this was done in [place]?).  Unfortunately, we do not have those results and patient cannot remember the name of facility she had the ultrasound done.  She is supposed to be getting a copy of the results in the mail and will bring it to our office.  Any further work-up depends on ultrasound results.    3. Encounter for screening for malignant neoplasm of colon  Colonoscopy unremarkable.  No family history of GI malignancy.  Colonoscopy for screening in 10 years, 2031.    Patient Instructions   1. High fiber, low fat diet with liberal water intake.   2. Patient will bring in copy of ultrasound to our office.  3. Colonoscopy for screening in 10 years.   4. Follow up: 3 months or sooner if needed    Jac Magallanes, APRN  12/20/2021    Please note that portions of this note may have been completed with a voice recognition program. Efforts were made to edit the dictations, but occasionally words are mistranscribed.

## 2021-12-22 LAB
CYTO UR: NORMAL
LAB AP CASE REPORT: NORMAL
LAB AP CLINICAL INFORMATION: NORMAL
PATH REPORT.FINAL DX SPEC: NORMAL
PATH REPORT.GROSS SPEC: NORMAL

## 2022-01-04 ENCOUNTER — OFFICE VISIT (OUTPATIENT)
Dept: OBSTETRICS AND GYNECOLOGY | Facility: CLINIC | Age: 52
End: 2022-01-04

## 2022-01-04 VITALS
WEIGHT: 116 LBS | HEIGHT: 63 IN | SYSTOLIC BLOOD PRESSURE: 126 MMHG | DIASTOLIC BLOOD PRESSURE: 72 MMHG | BODY MASS INDEX: 20.55 KG/M2

## 2022-01-04 DIAGNOSIS — Z98.890 POST-OPERATIVE STATE: Primary | ICD-10-CM

## 2022-01-04 PROCEDURE — 99024 POSTOP FOLLOW-UP VISIT: CPT | Performed by: OBSTETRICS & GYNECOLOGY

## 2022-01-04 NOTE — PROGRESS NOTES
"Subjective   Chief Complaint   Patient presents with   • Post-op     2w4d post op. no c/o   ID# [id number]  - Catherine Perera is a 51 y.o. year old  presenting to be seen for her post-operative visit.  Currently she reports no problems with eating, bowel movements, voiding, or wound drainage and pain is well controlled. She has had one period since procedure but currently not bleeding.  She reports she had a normal Pap smear in [place] this past December and she had a normal mammogram in [facility] last March.  She reports she is going back to [facility] sometime this year.    The pathology results from her procedure are in Tamar's record and are benign.      OTHER THINGS SHE WANTS TO DISCUSS TODAY:  Nothing else    The following portions of the patient's history were reviewed and updated as appropriate:current medications and allergies       Objective   /72   Ht 160 cm (63\")   Wt 52.6 kg (116 lb)   LMP 2021 (Within Days)   Breastfeeding No   BMI 20.55 kg/m²     General:  well developed; well nourished  no acute distress   Abdomen: soft, non-tender; no masses  no umbilical or inguinal hernias are present  no hepato-splenomegaly   Pelvis: Clinical staff was present for exam  External genitalia:  normal appearance of the external genitalia including Bartholin's and [place]'s glands.  :  urethral meatus normal;  Vaginal:  normal pink mucosa without prolapse or lesions.  Cervix:  normal appearance. stitch seen   Uterus:  normal size, shape and consistency.  Adnexa:  normal bimanual exam of the adnexa.  Rectal:  digital rectal exam not performed; anus visually normal appearing.          Assessment   1. S/P hysteroscopy with Myosure with cervical and endometrial polypectomy - pathology benign.      Plan   1. May return to full activity with no restrictions  2.  Reviewed to keep a menstrual diary and to let us know if menstrual cycles return to be and heavy more frequent than monthly or " longer than weekly.  3. She was encouraged to get yearly mammograms.  She should report any palpable breast lump(s) or skin changes regardless of mammographic findings.  I explained to Tamar that notification regarding her mammogram results will come from the center performing the study.  Our office will not be routinely calling with mammogram results.  It is her responsibility to make sure that the results from the mammogram are communicated to her by the breast center.  If she has any questions about the results, she is welcome to call our office anytime.  4. The importance of keeping all planned follow-up and taking all medications as prescribed was emphasized.  5. Follow up for annual exam in ~ one year   6. Request last pap from [place].     No orders of the defined types were placed in this encounter.         This note was electronically signed.    Alka Harper MD  January 4, 2022

## 2022-03-23 ENCOUNTER — OFFICE VISIT (OUTPATIENT)
Dept: GASTROENTEROLOGY | Facility: CLINIC | Age: 52
End: 2022-03-23

## 2022-03-23 VITALS
OXYGEN SATURATION: 99 % | HEART RATE: 64 BPM | SYSTOLIC BLOOD PRESSURE: 116 MMHG | TEMPERATURE: 97.9 F | BODY MASS INDEX: 21.09 KG/M2 | WEIGHT: 119 LBS | HEIGHT: 63 IN | DIASTOLIC BLOOD PRESSURE: 62 MMHG | RESPIRATION RATE: 18 BRPM

## 2022-03-23 DIAGNOSIS — R77.2 ELEVATED AFP: ICD-10-CM

## 2022-03-23 DIAGNOSIS — D50.9 IRON DEFICIENCY ANEMIA, UNSPECIFIED IRON DEFICIENCY ANEMIA TYPE: Primary | ICD-10-CM

## 2022-03-23 DIAGNOSIS — Z12.11 ENCOUNTER FOR SCREENING FOR MALIGNANT NEOPLASM OF COLON: ICD-10-CM

## 2022-03-23 PROCEDURE — 99213 OFFICE O/P EST LOW 20 MIN: CPT | Performed by: NURSE PRACTITIONER

## 2022-03-23 NOTE — PROGRESS NOTES
"     Follow Up Note     Date: 2022   Patient Name: Tamar Perera  MRN: [id number]  : 1970     Primary Care Provider: Sofia Bertrand DO     Chief Complaint   Patient presents with   • Follow-up   • Anemia     History of present illness:   3/23/2022  Tamar Perera is a 51 y.o. female who is here today for follow up for anemia.    She has been doing well. Denies history of melena, hematochezia or hematemesis. She had labs at PCP and was told she was not anemic. She brought a copy of ultrasound results from [facility] in [place], patient interpreted the results as \"normal\" (report was in Uzbek).     Interval History:  2021  She denies GI bleeding. She had hysteroscopy by GYN last Friday, does not have results yet. She had abdominal/liver ultrasound a week ago in [place], but does not know results yet and does not know the name of the facility she had the ultrasound performed at. They are mailing her a copy of the results and she will bring it to our office.      2021  She has a history of anemia for a few years. There is no history of GI bleeding, patient denies hematochezia, melena or hematemesis. The patient denies heavy menstrual cycles or nosebleeds. The patient denies recent change in bowel habits. There is no diarrhea or constipation. There is no history of abdominal pain. The patient denies nausea or vomiting. There is no history of reflux. The patient denies dysphagia or odynophagia. There is no history of recent significant weight loss. There is no history of liver disease in the past. There is no family history of GI malignancy. The patient has not had a colonoscopy in the past.     The patient was seen along with her  and video  with patient's consent.    Subjective      Past Medical History:   Diagnosis Date   • Iron deficiency anemia      Past Surgical History:   Procedure Laterality Date   • COLONOSCOPY N/A 2021    Procedure: COLONOSCOPY " with Biopsy and polypectomy;  Surgeon: Zoran Jasmine MD;  Location: [facility] ENDOSCOPY;  Service: Gastroenterology;  Laterality: N/A;   • ENDOSCOPY N/A 2021    Procedure: ESOPHAGOGASTRODUODENOSCOPY WITH BIOPSY ;  Surgeon: Zoran Jasmine MD;  Location: [facility] ENDOSCOPY;  Service: Gastroenterology;  Laterality: N/A;   • HYSTEROSCOPY W/ POLYPECTOMY  2021    with myosure      Family History   Problem Relation Age of Onset   • Diabetes Mother    • Diabetes Father    • Colon cancer Neg Hx    • Colon polyps Neg Hx    • Irritable bowel syndrome Neg Hx    • Liver cancer Neg Hx    • Liver disease Neg Hx    • Rectal cancer Neg Hx      Social History     Socioeconomic History   • Marital status:    Tobacco Use   • Smoking status: Former Smoker     Quit date:      Years since quittin.2   • Smokeless tobacco: Never Used   Vaping Use   • Vaping Use: Never used   Substance and Sexual Activity   • Alcohol use: Yes     Comment: social   • Drug use: Never   • Sexual activity: Defer     No current outpatient medications on file.     No Known Allergies     The following portions of the patient's history were reviewed and updated as appropriate: allergies, current medications, past family history, past medical history, past social history, past surgical history and problem list.  Objective     Physical Exam  Vitals and nursing note reviewed.   Constitutional:       General: She is not in acute distress.     Appearance: Normal appearance. She is well-developed.   HENT:      Head: Normocephalic and atraumatic.      Mouth/Throat:      Mouth: Mucous membranes are not pale, not dry and not cyanotic.   Eyes:      General: Lids are normal.   Neck:      Trachea: Trachea normal.   Cardiovascular:      Rate and Rhythm: Normal rate.   Pulmonary:      Effort: Pulmonary effort is normal. No respiratory distress.      Breath sounds: Normal breath sounds.   Abdominal:      Tenderness: There is no abdominal  "tenderness.   Skin:     General: Skin is warm and dry.   Neurological:      Mental Status: She is alert and oriented to person, place, and time.   Psychiatric:         Mood and Affect: Mood normal.         Speech: Speech normal.         Behavior: Behavior normal. Behavior is cooperative.       Vitals:    03/23/22 0923   BP: 116/62   Pulse: 64   Resp: 18   Temp: 97.9 °F (36.6 °C)   SpO2: 99%   Weight: 54 kg (119 lb)   Height: 160 cm (63\")     Body mass index is 21.08 kg/m².     Results Review:   I reviewed the patient's new clinical results.    No visits with results within 90 Day(s) from this visit.   Latest known visit with results is:   Lab Requisition on 12/17/2021   Component Date Value Ref Range Status   • Case Report 12/17/2021    Final                    Value:Surgical Pathology Report                         Case: [id number]                                  Authorizing Provider:  Alka Harper MD      Collected:           12/17/2021 02:37 PM          Ordering Location:     [facility]   Received:            12/20/2021 08:05 AM                                 LABORATORY                                                                   Pathologist:           Jose C Valencia MD                                                            Specimens:   1) - Cervix                                                                                         2) - Endometrial Curettings                                                               • Clinical Information 12/17/2021    Final                    Value:This result contains rich text formatting which cannot be displayed here.   • Final Diagnosis 12/17/2021    Final                    Value:This result contains rich text formatting which cannot be displayed here.   • Gross Description 12/17/2021    Final                    Value:This result contains rich text formatting which cannot be displayed here.   • Microscopic Description 12/17/2021    " Final                    Value:This result contains rich text formatting which cannot be displayed here.      Dated 6/29/2021 total bilirubin 0.3 alkaline phosphatase 41 AST 17 ALT 7 GGT 11 albumin 4.2     Dated 7/15/2021 hemoglobin 10.1 hematocrit 33.9 platelet count 261 iron 27 TIBC 402 iron saturation 7% vitamin B12 521, folate 9.3, serum ferritin 6     Dated 12/15/2021 hemoglobin 12 point hematocrit 37.0 platelet count 217    2022 (per Care everywhere summary in chart)-Total bilirubin 0.7 albumin 4.1 alkaline phosphatase 48 ALT 10 AST 19 hemoglobin 13.3 hematocrit 42.8 platelet count 240     CTAP with contrast dated 7/21/2021 no anterior normal wall masses noted with a marker over the right rectus muscle.  No acute changes are noted with prominence of the endometrial lining.  Electronically signed by MD Pritesh Johnson on 7/21/2021 at 1749     Colonoscopy dated 11/19/2021 per Dr. Jasmine  - One 3 to 4 mm polyp in the proximal rectum, removed with a cold snare. Resected and retrieved. Clinically   hyperplastic  - Diverticulosis in the sigmoid colon, in the descending colon, in the transverse colon and in the ascending colon.  - Non-bleeding internal hemorrhoids.  - The examined portion of the ileum was normal. Biopsied.  - No lower GI source of GI bleed identified that could cause anemia  -Terminal ileum biopsy unremarkable. Rectal polyp biopsy with mucosal prolapse type polyp.     EGD dated 11/24/2021 per Dr. Jasmine  - Normal oropharynx.  - Z-line regular, 35 cm from the incisors.  - 1 cm hiatal hernia.  - No gross lesions in esophagus.  - Normal cardia, gastric fundus, gastric body, incisura, antrum, prepyloric region of the stomach and pylorus.  Biopsied.  - Normal duodenal bulb, first portion of the duodenum, second portion of the duodenum and third portion of the duodenum. Biopsied.  - Duodenum biopsy unremarkable. Gastric biopsy with reactive gastropathy, negative H. Pylori.     Assessment / Plan   "    1. Iron deficiency anemia, unspecified iron deficiency anemia type  The patient denies melena, hematochezia or hematemesis. CBC dated 12/15/2021 normal, no anemia. She had labs at PCP and was told they were normal, no anemia, per patient. Cervical and uterine biopsies were normal.   Obtain copies of labs from PCP office.  CBC-patient wishes to pursue through the PCP office.  Consider small bowel PillCam if signs of GI bleeding or anemia returns..    12/20/2021  Patient denies GI bleeding.  Recent labs normal, no anemia.  No GI bleeding per colonoscopy or EGD.  She is following with gynecology and had hysteroscopy last week but does not have results yet.  Consider small bowel PillCam if endometrial biopsy comes back negative for neoplasia.     9/22/2021  Patient has a history of anemia for the past few years.  There is no history of GI bleeding, no hematochezia, melena or hematemesis.  Patient denies abdominal pain, nausea, constipation or diarrhea. No change in bowel habits. There is no history of heavy menstrual cycles or nosebleeds.  Labs in July 2021 with hemoglobin 10.1 and hematocrit 33.9, normal iron count at 27, iron saturation low at 7%, B12 normal. She has been taking oral iron supplements for about a month.  CTAP with unremarkable GI tract, although uterus was deviated to the left with thickening of the endometrium.  The patient has appointment for gynecology evaluation next week.    2. Elevated AFP  Liver enzymes normal per records in care everywhere.  Patient had abdominal ultrasound at [facility] 12/4/2021 with gallbladder, liver, pancreas, and spleen noted to be \"WNL\" (interpreted by patient as report is in Azeri with some English). CTAP with liver appearing normal and measures 13.8 cm in height, biliary normal, pancreas normal, spleen normal.  CMP-Patient wishes to pursue through PCP office.  Patient needs further evaluation if liver enzymes are elevated.    12/20/2021  AFP tumor marker " elevated at 10.6.  No history of elevated liver enzymes or liver disease in the past.  She had the abdominal ultrasound done in [place] a few weeks ago but does not have the results yet (Unsure why this was done in [place]?).  Unfortunately, we do not have those results and patient cannot remember the name of facility she had the ultrasound done.  She is supposed to be getting a copy of the results in the mail and will bring it to our office.  Any further work-up depends on ultrasound results.    3. Encounter for screening for malignant neoplasm of colon  Colonoscopy unremarkable.  No family history of GI malignancy.  Colonoscopy for screening in 10 years, 2031.    Patient Instructions   1. High fiber diet with liberal water intake.   2. Labs-Patient wishes to pursue through PCP office.   3. Colonoscopy for screening in 2031.   4. Follow up: The patient wants to call back    Jac Magallanes, APRN  3/23/2022    Please note that portions of this note may have been completed with a voice recognition program. Efforts were made to edit the dictations, but occasionally words are mistranscribed.

## 2022-03-23 NOTE — PATIENT INSTRUCTIONS
High fiber diet with liberal water intake.   Labs-Patient wishes to pursue through PCP office.   Colonoscopy for screening in 2031.   Follow up: The patient wants to call back

## (undated) DEVICE — Device

## (undated) DEVICE — HYBRID TUBING/CAP SET FOR OLYMPUS® SCOPES: Brand: ERBE

## (undated) DEVICE — SUCTION CANISTER, 1500CC, RIGID: Brand: DEROYAL

## (undated) DEVICE — FRCP BX RADJAW4 NDL 2.8 240 STD OG

## (undated) DEVICE — VLV SXN AIR/H2O ORCAPOD3 1P/U STRL

## (undated) DEVICE — CONMED SCOPE SAVER BITE BLOCK, 20X27 MM: Brand: SCOPE SAVER

## (undated) DEVICE — ENDOSCOPY PORT CONNECTOR FOR OLYMPUS® SCOPES: Brand: ERBE

## (undated) DEVICE — LUBE JELLY PK/2.75GM STRL BX/144